# Patient Record
Sex: MALE | Race: WHITE
[De-identification: names, ages, dates, MRNs, and addresses within clinical notes are randomized per-mention and may not be internally consistent; named-entity substitution may affect disease eponyms.]

---

## 2021-06-27 ENCOUNTER — HOSPITAL ENCOUNTER (EMERGENCY)
Dept: HOSPITAL 97 - ER | Age: 31
Discharge: LEFT BEFORE DISCHARGE | End: 2021-06-27
Payer: SELF-PAY

## 2021-06-27 VITALS — TEMPERATURE: 97.7 F | SYSTOLIC BLOOD PRESSURE: 130 MMHG | OXYGEN SATURATION: 98 % | DIASTOLIC BLOOD PRESSURE: 71 MMHG

## 2021-06-27 DIAGNOSIS — F17.210: ICD-10-CM

## 2021-06-27 DIAGNOSIS — Z53.21: ICD-10-CM

## 2021-06-27 DIAGNOSIS — K08.89: Primary | ICD-10-CM

## 2021-06-27 PROCEDURE — 99281 EMR DPT VST MAYX REQ PHY/QHP: CPT

## 2021-06-27 NOTE — ER
Nurse's Notes                                                                                     

 [facility]                                                                 

Name: Enmanuel Rico                                                                           

Age: 31 yrs                                                                                       

Sex: Male                                                                                         

: 1990                                                                                   

MRN: [id number]                                                                                   

Arrival Date: 2021                                                                          

Time: 02:53                                                                                       

Account#: [id number]                                                                            

Bed Waiting                                                                                       

Private MD:                                                                                       

Diagnosis:                                                                                        

                                                                                                  

Presentation:                                                                                     

                                                                                             

03:34 Chief complaint: Patient states: he has had a toothache to lower right jaw since 1400   bb  

      yesterday usually he can manage it with OTC medications but not this time has not been      

      to bed all night. Coronavirus screen: At this time, the client does not indicate any        

      symptoms associated with coronavirus-19. Ebola Screen: No symptoms or risks identified      

      at this time. Initial Sepsis Screen: Does the patient meet any 2 criteria? No.              

      Patient's initial sepsis screen is negative. Does the patient have a suspected source       

      of infection? No. Patient's initial sepsis screen is negative. Risk Assessment: Do you      

      want to hurt yourself or someone else? Patient reports no desire to harm self or            

      others. Onset of symptoms was 2021.                                                

03:34 Method Of Arrival: Ambulatory                                                             

03:34 Acuity: MAGGIE 4                                                                           bb  

                                                                                                  

Triage Assessment:                                                                                

03:36 General: Appears uncomfortable, Behavior is anxious. Pain: Complains of pain in right   bb  

      lower jaw Pain currently is 9 out of 10 on a pain scale. EENT: Reports pain in right        

      jaw. Neuro: Level of Consciousness is awake, alert, obeys commands, Oriented to person,     

      place, time, situation. Cardiovascular: Capillary refill < 3 seconds Patient's skin is      

      warm and dry. Respiratory: Respiratory effort is even, unlabored. GI: No signs and/or       

      symptoms were reported involving the gastrointestinal system. Derm: Skin is pink, warm      

      \T\ dry. Musculoskeletal: Circulation, motion, and sensation intact.                        

                                                                                                  

Historical:                                                                                       

- Allergies:                                                                                      

03:36 Hydrocodone-Acetaminophen;                                                              bb  

03:36 Tegretol;                                                                               bb  

- Home Meds:                                                                                      

03:36 None [Active];                                                                          bb  

- PMHx:                                                                                           

03:36 None;                                                                                   bb  

- PSHx:                                                                                           

03:36 Cholecystectomy; Appendectomy;                                                          bb  

                                                                                                  

- Immunization history:: Adult Immunizations up to date.                                          

- Social history:: Smoking status: Patient reports the use of cigarette tobacco                   

  products, smokes one pack cigarettes per day. Patient/guardian denies using alcohol,            

  street drugs.                                                                                   

                                                                                                  

                                                                                                  

Vital Signs:                                                                                      

03:34  / 71; Pulse 72; Resp 16 S; Temp 97.7(O); Pulse Ox 98% on R/A; Weight 113.4 kg    bb  

      (R); Height 5 ft. 11 in. (180.34 cm) (R); Pain 9/10;                                        

03:34 Body Mass Index 34.87 (113.40 kg, 180.34 cm)                                            bb  

                                                                                                  

ED Course:                                                                                        

02:53 Patient arrived in ED.                                                                  es  

03:36 Triage completed.                                                                       bb  

03:36 Arm band placed on Patient placed in waiting room, Patient notified of wait time.       bb  

04:39 Patient's name was called from ER lobby. No response. Unable to locate patient. Will    bb  

      disposition as left without being seen by a provider.                                       

                                                                                                  

Administered Medications:                                                                         

No medications were administered                                                                  

                                                                                                  

                                                                                                  

Outcome:                                                                                          

04:40 Patient left the ED.                                                                    bb  

                                                                                                  

Signatures:                                                                                       

Anastasia Naranjo Brenda, RN                     RN   bb                                                   

                                                                                                  

**************************************************************************************************

## 2021-06-27 NOTE — XMS REPORT
Continuity of Care Document

                            Created on:2021



Patient:KAYLA CHOI

Sex:Male

:1990

External Reference #:[id number]





Demographics







                          Address                   [address]



                                                    [place], [place] [address]

 

                          Home Phone                [phone]

 

                          Work Phone                [phone]

 

                          Mobile Phone              [phone]

 

                          Email Address             [email or web address].

[place]

 

                          Preferred Language        en

 

                          Marital Status            Unknown

 

                          Rastafari Affiliation     Unknown

 

                          Race                      Unknown

 

                          Additional Race(s)        Unavailable



                                                    White

 

                          Ethnic Group              Not  or 









Author







                          Organization              [facility]

t

 

                          Address                   [address]



                                                    [place] [address]

 

                          Phone                     [phone]









Support







                Name            Relationship    Address         Phone

 

                Bel           Mother          Unavailable     [phone]

 

                Jese           Father          Unavailable     [phone]









Care Team Providers







                    Name                Role                Phone

 

                    SALTY Hannah     Attending Clinician [phone]









Problems

This patient has no known problems.



Allergies, Adverse Reactions, Alerts

This patient has no known allergies or adverse reactions.



Medications

This patient has no known medications.



Procedures

This patient has no known procedures.



Encounters







        Start   End     Encounter Admission Attending Care    Care    Encounter 

Source



        Date/Time Date/Time Type    Type    Clinicians Facility Department ID   

   

 

        2021 Emergency         SARAH TorrezMB    [phone] [id number]

[id number] 



        19:07:00 21:54:00                 Hanna Arshad [id number]         



                                                [place] [id number]         



                                                [place]  [phone]         



                                                        084             







Results

This patient has no known results.